# Patient Record
Sex: FEMALE | Race: WHITE | URBAN - METROPOLITAN AREA
[De-identification: names, ages, dates, MRNs, and addresses within clinical notes are randomized per-mention and may not be internally consistent; named-entity substitution may affect disease eponyms.]

---

## 2021-10-20 ENCOUNTER — OFFICE VISIT (OUTPATIENT)
Dept: ORTHOPEDIC SURGERY | Age: 26
End: 2021-10-20
Payer: COMMERCIAL

## 2021-10-20 VITALS — WEIGHT: 180 LBS | HEIGHT: 67 IN | BODY MASS INDEX: 28.25 KG/M2

## 2021-10-20 DIAGNOSIS — M25.521 PAIN AND SWELLING OF ELBOW, RIGHT: ICD-10-CM

## 2021-10-20 DIAGNOSIS — S52.134A CLOSED NONDISPLACED FRACTURE OF NECK OF RIGHT RADIUS, INITIAL ENCOUNTER: Primary | ICD-10-CM

## 2021-10-20 DIAGNOSIS — M25.421 PAIN AND SWELLING OF ELBOW, RIGHT: ICD-10-CM

## 2021-10-20 PROCEDURE — L3660 SO 8 AB RSTR CAN/WEB PRE OTS: HCPCS | Performed by: INTERNAL MEDICINE

## 2021-10-20 PROCEDURE — 99203 OFFICE O/P NEW LOW 30 MIN: CPT | Performed by: INTERNAL MEDICINE

## 2021-10-20 PROCEDURE — 29105 APPLICATION LONG ARM SPLINT: CPT | Performed by: INTERNAL MEDICINE

## 2021-10-20 RX ORDER — TRAMADOL HYDROCHLORIDE 50 MG/1
50 TABLET ORAL EVERY 6 HOURS PRN
Qty: 18 TABLET | Refills: 0 | Status: SHIPPED | OUTPATIENT
Start: 2021-10-20 | End: 2021-10-27

## 2021-10-20 NOTE — PROGRESS NOTES
Chief Complaint:   Chief Complaint   Patient presents with    Elbow Pain     Rt arm elbow pain. Injury was on 10/16/21, pt fell off a skateboard. Pain is 6-7/10          History of Present Illness:       Patient is a 32 y.o. female presents with the above complaint. The symptoms began 4 daysago and started without an injury. The patient describes a throbbing pain that does not radiate. The symptoms are constant  and are show no change since the onset. She fell from a skateboard landing onto her elbow. Pain localizes to the lateral elbow and  does not seem to follow a typical epicondylitis provacative pattern. There are not associated mechanical symptoms. There is neuritic symptoms about the elbow. The patient admits to subjective instability about the elbow and admits to new onset or progressive weakness of the upper extremity. Pain level 7    The patient admits to a pattern of activity related swelling. Treatment to date: Immobilization without immobilization of of the wrist    There is nono prior history of elbow trauma. There is no prior history of autoimmune disease, crystal arthropathy, or crystal arthropathy. Past Medical History:      No past medical history on file. No past surgical history on file. Present Medications:         Current Outpatient Medications   Medication Sig Dispense Refill    traMADol (ULTRAM) 50 MG tablet Take 1 tablet by mouth every 6 hours as needed for Pain for up to 7 days. 18 tablet 0     No current facility-administered medications for this visit.          Allergies:      No Known Allergies     Social History:         Social History     Socioeconomic History    Marital status: Not on file     Spouse name: Not on file    Number of children: Not on file    Years of education: Not on file    Highest education level: Not on file   Occupational History    Not on file   Tobacco Use    Smoking status: Not on file   Substance and Sexual Activity    Alcohol use: Not on file    Drug use: Not on file    Sexual activity: Not on file   Other Topics Concern    Not on file   Social History Narrative    Not on file     Social Determinants of Health     Financial Resource Strain:     Difficulty of Paying Living Expenses:    Food Insecurity:     Worried About Running Out of Food in the Last Year:     [address] in the Last Year:    Transportation Needs:     Lack of Transportation (Medical):  Lack of Transportation (Non-Medical):    Physical Activity:     Days of Exercise per Week:     Minutes of Exercise per Session:    Stress:     Feeling of Stress :    Social Connections:     Frequency of Communication with Friends and Family:     Frequency of Social Gatherings with Friends and Family:     Attends Baptism Services:     Active Member of Clubs or Organizations:     Attends Club or Organization Meetings:     Marital Status:    Intimate Partner Violence:     Fear of Current or Ex-Partner:     Emotionally Abused:     Physically Abused:     Sexually Abused:         Review of Symptoms:    Pertinent items are noted in HPI    Review of systems reviewed from Patient History Form dated on today's date and   available in the patient's chart under the Media tab. Vital Signs: There were no vitals filed for this visit. General Exam:     Constitutional: Patient is adequately groomed with no evidence of malnutrition  Mental Status: The patient is oriented to time, place and person. The patient's mood and affect are appropriate. Vascular: Examination reveals no swelling or calf tenderness. Peripheral pulses are palpable and 2+.     Lymphatics: no lymphadenopathy of the inguinal region or lower extremity      Physical Exam: right elbow      Primary Exam:    Inspection: Negligible asymmetric soft tissue swelling no deformity or atrophy      Palpation: Mild over the radial/neck region      Range of Motion:0/120 low-grade discomfort end ranges      Strength supination normal at the wrist and digits low-grade pain extensor pronator mass, supination 30 degrees asymmetric decreased associate with pain      Special Tests: Negative      Skin: There are no rashes, ulcerations or lesions. Gait: Nonantalgic      Reflex intact lower     Additional Comments:        Additional Examinations:           Left Upper Extremity: Examination of the left upper extremity does not show any tenderness, deformity or injury. Range of motion is unremarkable. There is no gross instability. There are no rashes, ulcerations or lesions. Strength and tone are normal.  Neurologic -Light touch sensation and manual muscle testing is normal C5-C8 . Biceps and triceps reflexes are symmetric and +2. Spurlling sign is negative          Office Imaging Results/Procedures PerformedToday:            Office Procedures:     Orders Placed This Encounter   Procedures    XR ELBOW RIGHT (MIN 3 VIEWS)     Standing Status:   Future     Number of Occurrences:   1     Standing Expiration Date:   10/20/2022     Order Specific Question:   Reason for exam:     Answer:   f/u fx     X-rays three views right elbow  Evidence of a nondisplaced radial neck fracture with subtle superior angulation. Radiocapitellar joint is congruent radial humeral ulnohumeral joints have normal radiographic appearance otherwise no other soft tissue or osseous abnormalities. Other Outside Imaging and Testing Personally Reviewed:     XR ELBOW RIGHT AP LATERAL AND OBLIQUES    Impression        Nondisplaced, minimally angulated radial neck fracture with a small elbow joint effusion. NOTE:  Report marked for documented transmission to the ordering physician per [facility] protocol  Narrative    HISTORY: Unspecified fall, initial encounter; Unspecified place in unspecified non-institutional (private) residence as the place of occurrence of the external cause   COMPARISON: None   NOTE:  If there are questions about the content of this report, please contact [facility] radiology by calling [phone]     FINDINGS:                   BONES:  Essentially nondisplaced minimally angulated radial neck fracture. JOINTS:  Small elbow joint effusion. SOFT TISSUES:  Unremarkable   OTHER:  None       Date: 10/17/21   Received From: [facility]                   Assessment   Impression: . Encounter Diagnoses   Name Primary?  Pain and swelling of elbow, right     Closed nondisplaced fracture of neck of right radius, initial encounter Yes              Plan:       Long-arm splint immobilization along with sling immobilization  Transition to sling immobilization as sole form of mobilization as tolerated over the next 3 to 5 days  Repeat x-rays three views of the elbow on follow-up  Medical pain management Tylenol as needed use of NSAIDs GI precaution and Ultram short-term minimize use of narcotics      The nature of the finding, probable diagnosis and likely treatment was thoroughly discussed with the patient. The options, risks, complications, alternative treatment as well as some of the differential diagnosis was discussed. The patient was thoroughly informed and all questions were answered. the patient indicated understanding and satisfaction with the discussion. Orders:        Orders Placed This Encounter   Procedures    XR ELBOW RIGHT (MIN 3 VIEWS)     Standing Status:   Future     Number of Occurrences:   1     Standing Expiration Date:   10/20/2022     Order Specific Question:   Reason for exam:     Answer:   f/u fx           Disclaimer: \"This note was dictated with voice recognition software. Though review and correction are routine, we apologize for any errors. \"